# Patient Record
Sex: MALE | Race: WHITE
[De-identification: names, ages, dates, MRNs, and addresses within clinical notes are randomized per-mention and may not be internally consistent; named-entity substitution may affect disease eponyms.]

---

## 2017-03-01 ENCOUNTER — HOSPITAL ENCOUNTER (OUTPATIENT)
Dept: HOSPITAL 47 - RADMRIMAIN | Age: 52
Discharge: HOME | End: 2017-03-01
Payer: COMMERCIAL

## 2017-03-01 DIAGNOSIS — M19.011: ICD-10-CM

## 2017-03-01 DIAGNOSIS — M67.813: ICD-10-CM

## 2017-03-01 DIAGNOSIS — M67.411: ICD-10-CM

## 2017-03-01 DIAGNOSIS — S43.431A: Primary | ICD-10-CM

## 2017-03-01 NOTE — MR
EXAMINATION TYPE: MR shoulder RT wo con

 

DATE OF EXAM: 3/1/2017 5:57 PM

 

COMPARISON: NONE

 

HISTORY: 51-year-old male with right shoulder pain x 5 months, no trauma

 

TECHNIQUE: Multiplanar, multisequence imaging of the right shoulder is performed without contrast.

 

FINDINGS:

The long head biceps tendon is intact.

 

Subscapularis tendon is intact.

 

There is mild to moderate degenerative joint space narrowing with marginal spurring and subchondral c
hanges at the AC joint. No significant inferior spurring impinging onto the underlying rotator cuff.

 

There is heterogeneous signal of both the supraspinatus and infraspinatus tendons with intermediate s
ignal thickening especially along the posterior supraspinatus. There is a 6 mm long by 7 mm AP intras
ubstance tear at the footprint of the posterior supraspinatus tendon fibers with possible bursal side
d communication, coronal image 17 and sagittal image 6.

 

There is trace thickening of the overlying subdeltoid bursa without fluid distention.

 

No atrophy of the rotator cuff musculature.

 

There is a tear of the superior labrum which extends back and around by 180 degrees to the inferior l
abrum where a multilocular paralabral cyst is present measuring up to 1.7 cm long and 0.7 mm thick, c
oronal T2 FS image 19. There is some mild edema within the axillary recess but no abnormal thickening
 of the coracohumeral ligament.  There is suggestion of a ganglion cyst approaching the suprascapular
 notch measuring 1.3 x 0.6 cm.

 

Evaluation of the glenohumeral joint shows moderate irregular posterior cartilage thinning and joint 
space narrowing. Physiologic joint fluid is present.

 

No suspicious bone marrow replacement. Red marrow reconversion is present and can be seen with anemia
, obesity, smoking, chronic disease.

 

No Hill-Sachs deformity or os acromiale.

 

 

IMPRESSION: 

 

1. Supraspinatus and infraspinatus tendinosis, greatest along the posterior supraspinatus tendon wher
e an intrasubstance tear at the footprint measures 6 mm long and 7 mm AP dimension. There may be a sm
all bursal sided communication of the tear.

2. Large SLAP tear extending back and around 180 degrees. There is a 1.3 cm long ganglion cyst which 
extends towards the suprascapular notch and additional multilocular 1.7 cm paralabral cyst inferiorly
.

3. Underlying glenohumeral joint osteoarthrosis with moderate cartilage thinning and joint space narr
owing posteriorly.

4. Moderate AC joint osteoarthrosis.

## 2019-03-15 ENCOUNTER — HOSPITAL ENCOUNTER (OUTPATIENT)
Dept: HOSPITAL 47 - ORWHC2ENDO | Age: 54
Discharge: HOME | End: 2019-03-15
Attending: INTERNAL MEDICINE
Payer: COMMERCIAL

## 2019-03-15 VITALS — HEART RATE: 61 BPM | SYSTOLIC BLOOD PRESSURE: 115 MMHG | DIASTOLIC BLOOD PRESSURE: 74 MMHG

## 2019-03-15 VITALS — RESPIRATION RATE: 16 BRPM | TEMPERATURE: 97.3 F

## 2019-03-15 VITALS — BODY MASS INDEX: 26.9 KG/M2

## 2019-03-15 DIAGNOSIS — K22.70: ICD-10-CM

## 2019-03-15 DIAGNOSIS — G25.81: ICD-10-CM

## 2019-03-15 DIAGNOSIS — Z88.6: ICD-10-CM

## 2019-03-15 DIAGNOSIS — K57.30: ICD-10-CM

## 2019-03-15 DIAGNOSIS — K29.50: ICD-10-CM

## 2019-03-15 DIAGNOSIS — Z79.899: ICD-10-CM

## 2019-03-15 DIAGNOSIS — K44.9: ICD-10-CM

## 2019-03-15 DIAGNOSIS — K21.9: ICD-10-CM

## 2019-03-15 DIAGNOSIS — Z12.11: Primary | ICD-10-CM

## 2019-03-15 DIAGNOSIS — Z87.891: ICD-10-CM

## 2019-03-15 PROCEDURE — 88305 TISSUE EXAM BY PATHOLOGIST: CPT

## 2019-03-15 PROCEDURE — 43239 EGD BIOPSY SINGLE/MULTIPLE: CPT

## 2019-03-15 NOTE — P.PCN
Date of Procedure: 03/15/19


Procedure(s) Performed: 


Brief history:


Patient is a pleasant 53-year-old white male, scheduled for an elective upper 

endoscopy as well as colonoscopy as a part of evaluation of ascending history of

GERD and screening for colorectal neoplasia.





Procedure performed:


Esophagogastroduodenoscopy with biopsy


Colonoscopy





Preoperative diagnosis:


Long-standing history of GERD


Screening for colon cancer





Anesthesia: MAC





Procedure:


After informed consent was obtained from the patient  was brought into the 

endoscopy unit and IV  sedation was administered by anesthesia under continuous 

monitoring.  Initially upper endoscopy was done.  The Olympus  video 

endoscope was inserted inserted into the mouth and esophagus intubated without 

any difficulty and was gradually advanced into the stomach and duodenum and 

carefully examined.  The bulb and second part of the duodenum appeared normal.  

The scope was then withdrawn into the stomach adequately insufflated with air 

and upon careful examination  the antrum had mild gastritis and biopsies were 

done from this area.  The body, cardia and fundus appeared normal.  The scope 

was then withdrawn into the esophagus.  The GE junction was located at 38 cm to 

the incisors.  Moderate size hiatal hernia noted.  There was long segment of 

Ho's esophagus and from 36-38 cm from the incisors and proximal to this 

there were linear erosions or ulcerations consistent with LA grade C reflux 

esophagitis.  Multiple biopsies were done from the segment of Ho's 

esophagus.  Rest of the esophagus appeared normal.  Patient tolerated the 

procedure well.





At this time the patient continued to remain sedation.  Initial digital rectal 

examination was normal.  Olympus  video colonoscope was then inserted into

the rectum and gradually advanced to the cecum without any difficulty.  Careful 

examination was performed as the scope was gradually being withdrawn.  The prep 

was excellent.  The cecum, ascending colon, transverse colon, descending colon, 

sigmoid colon and rectum appeared normal. scattered right-sided diverticulosis. 

  Retroflexion was performed in the rectum and no lesions were noted.  Patient 

tolerated the procedure well.





Impression:


1.  Upper endoscopy revealed long segment Ho's esophagus, LA grade C reflux

esophagitis and moderate size hiatal hernia


2.  Colonoscopy revealed scattered sigmoid diverticulosis but no evidence of 

colitis or colorectal neoplasia 








Recommendations:


Findings of this examination were discussed with the patient as well as his 

family.  He was advised to follow with the biopsy results.  He was given a 

prescription for Prilosec 20 mg twice daily for 8 weeks following which once 

daily as maintenance for severe reflux esophagitis.  If the biopsy shows 

evidence of Ho's esophagus, he can have a repeat surveillance upper 

endoscopy in 2 years.  He can have a repeat screening colonoscopy in 10 years